# Patient Record
Sex: FEMALE | Race: WHITE | NOT HISPANIC OR LATINO | RURAL
[De-identification: names, ages, dates, MRNs, and addresses within clinical notes are randomized per-mention and may not be internally consistent; named-entity substitution may affect disease eponyms.]

---

## 2022-10-14 ENCOUNTER — HOSPITAL ENCOUNTER (EMERGENCY)
Facility: HOSPITAL | Age: 47
Discharge: HOME | End: 2022-10-14
Attending: FAMILY MEDICINE
Payer: COMMERCIAL

## 2022-10-14 VITALS
WEIGHT: 176.5 LBS | SYSTOLIC BLOOD PRESSURE: 102 MMHG | RESPIRATION RATE: 16 BRPM | DIASTOLIC BLOOD PRESSURE: 69 MMHG | BODY MASS INDEX: 27.7 KG/M2 | OXYGEN SATURATION: 99 % | HEART RATE: 69 BPM | TEMPERATURE: 98 F | HEIGHT: 67 IN

## 2022-10-14 DIAGNOSIS — M25.572 LEFT ANKLE PAIN: ICD-10-CM

## 2022-10-14 DIAGNOSIS — M79.672 FOOT PAIN, LEFT: ICD-10-CM

## 2022-10-14 DIAGNOSIS — S93.492A SPRAIN OF OTHER LIGAMENT OF LEFT ANKLE, INITIAL ENCOUNTER: Primary | ICD-10-CM

## 2022-10-14 PROCEDURE — 99284 PR EMERGENCY DEPT VISIT,LEVEL IV: ICD-10-PCS | Mod: ,,, | Performed by: FAMILY MEDICINE

## 2022-10-14 PROCEDURE — 99284 EMERGENCY DEPT VISIT MOD MDM: CPT

## 2022-10-14 PROCEDURE — 25000003 PHARM REV CODE 250: Performed by: FAMILY MEDICINE

## 2022-10-14 PROCEDURE — 99284 EMERGENCY DEPT VISIT MOD MDM: CPT | Mod: ,,, | Performed by: FAMILY MEDICINE

## 2022-10-14 RX ORDER — OXYCODONE AND ACETAMINOPHEN 5; 325 MG/1; MG/1
1 TABLET ORAL
Status: COMPLETED | OUTPATIENT
Start: 2022-10-14 | End: 2022-10-14

## 2022-10-14 RX ORDER — OXYCODONE AND ACETAMINOPHEN 5; 325 MG/1; MG/1
1 TABLET ORAL EVERY 6 HOURS PRN
Qty: 15 TABLET | Refills: 0 | Status: SHIPPED | OUTPATIENT
Start: 2022-10-14

## 2022-10-14 RX ADMIN — OXYCODONE HYDROCHLORIDE AND ACETAMINOPHEN 1 TABLET: 5; 325 TABLET ORAL at 11:10

## 2022-10-14 NOTE — Clinical Note
"Mery Pallawrence Cabrera was seen and treated in our emergency department on 10/14/2022.  She may return to work on 10/18/2022.       If you have any questions or concerns, please don't hesitate to call.      Susan Larry MD"

## 2022-10-15 NOTE — ED PROVIDER NOTES
Encounter Date: 10/14/2022    SCRIBE #1 NOTE: I, Jada Shafer, am scribing for, and in the presence of,  Susan Ryan MD. I have scribed the entire note.     History     Chief Complaint   Patient presents with    left ankle injury     Twisted left ankle stepping on a step     Patient is a 47 y.o. female who presents to the emergency department complaining of left foot/ankle pain secondary to injury occurring just prior to arrival. Patient states that she was at a wedding rehearsal this evening when she stepped off a brick like step, twisting the ankle. She reports pain over the lateral aspect of the left proximal foot. No other symptoms were reported.     The history is provided by the patient. No  was used.   Review of patient's allergies indicates:  No Known Allergies  Past Medical History:   Diagnosis Date    Anxiety disorder, unspecified     Hypothyroidism, unspecified     OCD (obsessive compulsive disorder)      History reviewed. No pertinent surgical history.  History reviewed. No pertinent family history.  Social History     Tobacco Use    Smoking status: Never    Smokeless tobacco: Never     Review of Systems   Constitutional: Negative.    Eyes: Negative.    Endocrine: Negative.    Genitourinary: Negative.    Musculoskeletal:         Left foot/ ankle pain   Allergic/Immunologic: Negative.    Neurological: Negative.    All other systems reviewed and are negative.    Physical Exam     Initial Vitals [10/14/22 2245]   BP Pulse Resp Temp SpO2   111/72 70 18 98 °F (36.7 °C) 99 %      MAP       --         Physical Exam    Vitals reviewed.  Constitutional: She appears well-developed and well-nourished. No distress.   HENT:   Head: Normocephalic.   Eyes: Conjunctivae are normal. Pupils are equal, round, and reactive to light.   Cardiovascular:  Normal rate, regular rhythm, normal heart sounds and intact distal pulses.           Pulmonary/Chest: Breath sounds normal.   Abdominal:  Abdomen is soft. Bowel sounds are normal.   Musculoskeletal:      Comments: Tenderness present over left foot, specifically the 4th and 5th metatarsal bones      Neurological: She is alert and oriented to person, place, and time.   Skin: Skin is warm and dry.   Psychiatric: She has a normal mood and affect.       ED Course   Procedures  Labs Reviewed - No data to display       Imaging Results              X-Ray Foot Complete Left (In process)  Result time 10/14/22 23:09:34                     X-Ray Ankle Complete Left (In process)                      Medications   oxyCODONE-acetaminophen 5-325 mg per tablet 1 tablet (1 tablet Oral Given 10/14/22 5868)                Attending Attestation:           Physician Attestation for Scribe:  Physician Attestation Statement for Scribe #1: I, Susan Ryan MD, reviewed documentation, as scribed by Jada Shafer in my presence, and it is both accurate and complete.                        Clinical Impression:   Final diagnoses:  [M25.572] Left ankle pain  [M79.672] Foot pain, left  [S93.492A] Sprain of other ligament of left ankle, initial encounter (Primary)        ED Disposition Condition    Discharge Stable          ED Prescriptions       Medication Sig Dispense Start Date End Date Auth. Provider    oxyCODONE-acetaminophen (PERCOCET) 5-325 mg per tablet Take 1 tablet by mouth every 6 (six) hours as needed for Pain. 15 tablet 10/14/2022 -- Susan Larry MD          Follow-up Information    None          Susan Larry MD  10/14/22 5182

## 2022-10-15 NOTE — ED NOTES
Ace wrap place to left ankle, Cap refill less than 3 seconds. Crutches provided, instructed pt on how to use crutches, return demonstration performed. Per MD order.

## 2022-10-15 NOTE — DISCHARGE INSTRUCTIONS
You can apply ice to your ankle for 20 mins every hour for the first 24 hours. Then you can go every 4 hours thereafter. Always place a towel between your ... and ice. You can take Ibuprofen as needed for the pain. Elevate your ... .You can also use a brace or bandage to compress the joint.